# Patient Record
(demographics unavailable — no encounter records)

---

## 2024-10-29 NOTE — PHYSICAL EXAM
[Well Developed] : well developed [Well Nourished] : well nourished [No Acute Distress] : no acute distress [Obese] : obese [Normal S1, S2] : normal S1, S2 [No Murmur] : no murmur [Clear Lung Fields] : clear lung fields [Good Air Entry] : good air entry [No Respiratory Distress] : no respiratory distress  [Soft] : abdomen soft [Normal Gait] : normal gait [No Edema] : no edema [Moves all extremities] : moves all extremities [Normal Speech] : normal speech [Alert and Oriented] : alert and oriented [de-identified] : irregularly irregular

## 2024-10-29 NOTE — DISCUSSION/SUMMARY
[FreeTextEntry1] : We had an extensive conversation regarding the nature of atrial flutter, including potential etiologies, underlying pathophysiology and natural history of the disease. In addition, the potential risk of thromboembolic events and assessment of that risk were discussed.   In addition, the maintenance of sinus rhythm along with adjuvant antiarrhythmic agents and catheter ablation therapy were discussed. The rationale for and risks of ablation therapy were discussed, including but not limited to bleeding, vascular injury, groin complications, cardiac perforation and tamponade, stroke, esophageal injury, pulmonary vein stenosis, need for pacemaker, need for cardiac surgery, and death. In addition, the long-term and ongoing nature of this therapy were also discussed, including the critical role of continued monitoring post-ablation and the potential for the necessity of repeat ablation procedures to definitively treat the condition.   The patient verbalized understanding of the discussion and all questions were addressed and answered. The patient would like to proceed with ablation. [EKG obtained to assist in diagnosis and management of assessed problem(s)] : EKG obtained to assist in diagnosis and management of assessed problem(s)

## 2024-10-29 NOTE — PHYSICAL EXAM
[Well Developed] : well developed [Well Nourished] : well nourished [No Acute Distress] : no acute distress [Obese] : obese [Normal S1, S2] : normal S1, S2 [No Murmur] : no murmur [Clear Lung Fields] : clear lung fields [Good Air Entry] : good air entry [No Respiratory Distress] : no respiratory distress  [Soft] : abdomen soft [Normal Gait] : normal gait [No Edema] : no edema [Moves all extremities] : moves all extremities [Normal Speech] : normal speech [Alert and Oriented] : alert and oriented [de-identified] : irregularly irregular

## 2024-10-29 NOTE — HISTORY OF PRESENT ILLNESS
[FreeTextEntry1] : PCP: Dr. Edmonds  52 yo M transit  with history of seizures, current daily smoker (vapes) [place] [facility] [place] [facility] [place].  He presented to urgent care for 3 days of fevers/chills, nausea, vomiting, diarrhea. He was diagnosed with viral gastroenteritis but noted to be tachycardic and diaphoretic. ECG revealed new onset AFlutter with RVR (HR 120s). Patient reports no history of AFib or AFlutter. Went to ED and was given cardizem and discharged. Patient has never seen a cardiologist.   7/8/2024 Here for follow up of MCOT and stress echo. He denies chest pain, dyspnea, palpitations, dizziness, lightheadedness, presyncope or syncope.   10/28/2024 Here for routine follow up visit. Denies any cardiovascular symptoms.

## 2024-10-29 NOTE — ASSESSMENT
[FreeTextEntry1] : # Paroxysmal AFlutter with RVR - Currently in AFlutter with RVR - No evidence of AFib but given the significant risk of concomitant arrhythmias, would recommend AFib ablation at time of AFlutter ablation. Patient is agreeable. Will set up ablation for Nov 15th.  - Cont Metoprolol Succ  mg daily.  - Stress echo without structural heart disease and exercise induced arrhythmias. Normal EF and normal LA size.  - On aspirin 81 mg daily for CHADS VASc 0. - Was rec sleep study eval by pulm.    # Pulm Nodule - Seeing pulm.   I have also advised the patient to go to the nearest emergency room if he experiences any chest pain, dyspnea, syncope, or has any other compelling symptoms.  Follow up in 4 mo

## 2024-10-29 NOTE — CARDIOLOGY SUMMARY
[de-identified] : 10/28/2024 AFlutter with cont VR (HR 89 bpm) 7/8/2024 NSR (HR 60 bpm) 6/4/2024 NSR (HR 75 bpm) [de-identified] : 6/4-7/3/24 No sig arrhythmias.  [de-identified] : Stress ECHO 6/26/2024  Grabiel, 10:06, 97% MPHR, 12.1 METS. Normal EF (60%). No ischemia. Occ PVCs with exertion. Normal HR response. Normal LA size. No sig valvular disease.    [de-identified] : never

## 2024-10-29 NOTE — HISTORY OF PRESENT ILLNESS
[FreeTextEntry1] : PCP: Dr. Edmonds  54 yo M transit  with history of seizures, current daily smoker (vapes) BIBEMS to [facility] 5/26/24 [place] [facility] [place] ECG.  He presented to urgent care for 3 days of fevers/chills, nausea, vomiting, diarrhea. He was diagnosed with viral gastroenteritis but noted to be tachycardic and diaphoretic. ECG revealed new onset AFlutter with RVR (HR 120s). Patient reports no history of AFib or AFlutter. Went to ED and was given cardizem and discharged. Patient has never seen a cardiologist.   7/8/2024 Here for follow up of MCOT and stress echo. He denies chest pain, dyspnea, palpitations, dizziness, lightheadedness, presyncope or syncope.   10/28/2024 Here for routine follow up visit. Denies any cardiovascular symptoms.

## 2024-12-18 NOTE — CARDIOLOGY SUMMARY
[de-identified] : 12/18/2024 NSR (HR 66 bpm) 10/28/2024 AFlutter with cont VR (HR 89 bpm) 7/8/2024 NSR (HR 60 bpm) 6/4/2024 NSR (HR 75 bpm) [de-identified] : 6/4-7/3/24 No sig arrhythmias.  [de-identified] : Stress ECHO 6/26/2024  Grabiel, 10:06, 97% MPHR, 12.1 METS. Normal EF (60%). No ischemia. Occ PVCs with exertion. Normal HR response. Normal LA size. No sig valvular disease.    [de-identified] : never

## 2024-12-18 NOTE — ASSESSMENT
[FreeTextEntry1] : # Paroxysmal AFlutter with RVR s/p PFA AFib and AFlutter ablation 11/22/2024 - Stress echo without structural heart disease and exercise induced arrhythmias. Normal EF and normal LA size.  - Patient has no arrhythmias since the catheter ablation. There are no groin hematomas or bleeding. Patient is pleased with the results of catheter ablation, although is aware with the risk of recurrent symptoms and need for another ablation. Post ablation care was discussed in great length. I discussed with patient contacting me in case of any symptoms suggestive of recurrence.  - Decrease Metoprolol Succ ER from 100 mg daily to 50 mg daily.  - Currently on Eliquis 5 mg PO BID after ablation 11/22/2024 for CHADS VASc 0. Advised to take it for 3 mo. Can discontinue after that.  - Was rec sleep study eval by pulm.    # Pulm Nodule - Seeing pulm.   I have also advised the patient to go to the nearest emergency room if he experiences any chest pain, dyspnea, syncope, or has any other compelling symptoms.  Follow up in 2-3 mo

## 2024-12-18 NOTE — HISTORY OF PRESENT ILLNESS
[FreeTextEntry1] : PCP: Dr. Edmonds  54 yo M transit  with history of seizures, current daily smoker (vapes) BIBEMS to [facility] [place]24 [place] [facility] [place].  He presented to urgent care for 3 days of fevers/chills, nausea, vomiting, diarrhea. He was diagnosed with viral gastroenteritis but noted to be tachycardic and diaphoretic. ECG revealed new onset AFlutter with RVR (HR 120s). Patient reports no history of AFib or AFlutter. Went to ED and was given cardizem and discharged. Patient has never seen a cardiologist.   7/8/2024 Here for follow up of MCOT and stress echo. He denies chest pain, dyspnea, palpitations, dizziness, lightheadedness, presyncope or syncope.   10/28/2024 Here for routine follow up visit. Denies any cardiovascular symptoms.   12/18/2024 Here for routine follow up visit after PFA AFib/AFlutter ablation 11/22/2024. Feels well. Denies any cardiovascular complaints. Compliant with Eliquis. Denies bleeding.

## 2024-12-18 NOTE — PHYSICAL EXAM
[Well Developed] : well developed [Well Nourished] : well nourished [No Acute Distress] : no acute distress [Obese] : obese [Normal S1, S2] : normal S1, S2 [No Murmur] : no murmur [Clear Lung Fields] : clear lung fields [Good Air Entry] : good air entry [No Respiratory Distress] : no respiratory distress  [Soft] : abdomen soft [Normal Gait] : normal gait [No Edema] : no edema [Moves all extremities] : moves all extremities [Normal Speech] : normal speech [Alert and Oriented] : alert and oriented [de-identified] : Bilateral groins well healed. No hematoma, ecchymosis, or bleeding noted.

## 2025-01-08 NOTE — CARDIOLOGY SUMMARY
[de-identified] : 1/8/2025 NSR (HR 66 bpm) 12/18/2024 NSR (HR 66 bpm) 10/28/2024 AFlutter with cont VR (HR 89 bpm) 7/8/2024 NSR (HR 60 bpm) 6/4/2024 NSR (HR 75 bpm) [de-identified] : 6/4-7/3/24 No sig arrhythmias.  [de-identified] : Stress ECHO 6/26/2024  Grabiel, 10:06, 97% MPHR, 12.1 METS. Normal EF (60%). No ischemia. Occ PVCs with exertion. Normal HR response. Normal LA size. No sig valvular disease.    [de-identified] : never

## 2025-01-08 NOTE — HISTORY OF PRESENT ILLNESS
[FreeTextEntry1] : PCP: Dr. Edmonds  54 yo M transit  with history of seizures, current daily smoker (vapes) [place]EM[place] to [facility] [place] [facility] [place].  He presented to urgent care for 3 days of fevers/chills, nausea, vomiting, diarrhea. He was diagnosed with viral gastroenteritis but noted to be tachycardic and diaphoretic. ECG revealed new onset AFlutter with RVR (HR 120s). Patient reports no history of AFib or AFlutter. Went to ED and was given cardizem and discharged. Patient has never seen a cardiologist.   7/8/2024 Here for follow up of MCOT and stress echo. He denies chest pain, dyspnea, palpitations, dizziness, lightheadedness, presyncope or syncope.   10/28/2024 Here for routine follow up visit. Denies any cardiovascular symptoms.   12/18/2024 Here for routine follow up visit after PFA AFib/AFlutter ablation 11/22/2024. Feels well. Denies any cardiovascular complaints. Compliant with Eliquis. Denies bleeding.   1/8/2025 Here for routine follow up visit after PFA AFib/AFlutter ablation 11/22/2024. Feels well. Denies any cardiovascular complaints. Compliant with Eliquis. Denies bleeding.

## 2025-01-08 NOTE — ASSESSMENT
[FreeTextEntry1] : # Paroxysmal AFlutter with RVR s/p PFA AFib and AFlutter ablation 11/22/2024 - Stress echo without structural heart disease and exercise induced arrhythmias. Normal EF and normal LA size.  - Patient has no arrhythmias since the catheter ablation. There are no groin hematomas or bleeding. Patient is pleased with the results of catheter ablation, although is aware with the risk of recurrent symptoms and need for another ablation. Post ablation care was discussed in great length. I discussed with patient contacting me in case of any symptoms suggestive of recurrence.  - Cont Metoprolol Succ ER 50 mg daily.  - Currently on Eliquis 5 mg PO BID after ablation 11/22/2024 for CHADS VASc 0. Advised to take it for 3 mo. Can discontinue after that.  - Cardio prevention clinic referral for NASIR eval and nutrition/weight loss counseling   # Pulm Nodule - Seeing pulm.   I have also advised the patient to go to the nearest emergency room if he experiences any chest pain, dyspnea, syncope, or has any other compelling symptoms.  Follow up in 3 mo

## 2025-01-08 NOTE — PHYSICAL EXAM
[Well Developed] : well developed [Well Nourished] : well nourished [No Acute Distress] : no acute distress [Obese] : obese [Normal S1, S2] : normal S1, S2 [No Murmur] : no murmur [Clear Lung Fields] : clear lung fields [Good Air Entry] : good air entry [No Respiratory Distress] : no respiratory distress  [Soft] : abdomen soft [Normal Gait] : normal gait [No Edema] : no edema [Moves all extremities] : moves all extremities [Normal Speech] : normal speech [Alert and Oriented] : alert and oriented

## 2025-04-14 NOTE — HISTORY OF PRESENT ILLNESS
[FreeTextEntry1] : PCP: Dr. Edmonds  55 yo M transit  with history of seizures, current daily smoker (vapes) [place] [facility] [place] [facility] [place].  He presented to urgent care for 3 days of fevers/chills, nausea, vomiting, diarrhea. He was diagnosed with viral gastroenteritis but noted to be tachycardic and diaphoretic. ECG revealed new onset AFlutter with RVR (HR 120s). Patient reports no history of AFib or AFlutter. Went to ED and was given Cardizem and discharged. Patient has never seen a cardiologist.   7/8/2024 Here for follow up of MCOT and stress echo. He denies chest pain, dyspnea, palpitations, dizziness, lightheadedness, presyncope or syncope.   10/28/2024 Here for routine follow up visit. Denies any cardiovascular symptoms.   12/18/2024 Here for routine follow up visit after PFA AFib/AFlutter ablation 11/22/2024. Feels well. Denies any cardiovascular complaints. Compliant with Eliquis. Denies bleeding.   1/8/2025 Here for routine follow up visit after PFA AFib/AFlutter ablation 11/22/2024. Feels well. Denies any cardiovascular complaints. Compliant with Eliquis. Denies bleeding.   4/14/2025 Here for routine follow up. Feels well. Denies any cardiovacular complaints.

## 2025-04-14 NOTE — ASSESSMENT
[FreeTextEntry1] : # Paroxysmal AFlutter with RVR s/p PFA AFib and AFlutter ablation 11/22/2024 - Stress echo without structural heart disease and exercise induced arrhythmias. Normal EF and normal LA size.  - Patient has no arrhythmias since the catheter ablation. Patient is pleased with the results of catheter ablation, although is aware with the risk of recurrent symptoms and need for another ablation. I discussed with patient contacting me in case of any symptoms suggestive of recurrence.  - Cont Metoprolol Succ ER 50 mg daily.  - Currently on Eliquis 5 mg PO BID after ablation 11/22/2024 for CHADS VASc 0. Completed 3 mo of OAC. Advised pt he can discontinue Eliquis.  - Cardio prevention clinic referral for NASIR eval and nutrition/weight loss counseling prev provided   # Pulm Nodule - Seeing pulm.   I have also advised the patient to go to the nearest emergency room if he experiences any chest pain, dyspnea, syncope, or has any other compelling symptoms.  Follow up in 6 mo or sooner if recurrent symptoms

## 2025-04-14 NOTE — CARDIOLOGY SUMMARY
[de-identified] : 4/14/2025 NSR (HR 67 bpm) 1/8/2025 NSR (HR 66 bpm) 12/18/2024 NSR (HR 66 bpm) 10/28/2024 AFlutter with cont VR (HR 89 bpm) 7/8/2024 NSR (HR 60 bpm) 6/4/2024 NSR (HR 75 bpm) [de-identified] : 6/4-7/3/24 No sig arrhythmias.  [de-identified] : Stress ECHO 6/26/2024 Grabiel, 10:06, 97% MPHR, 12.1 METS. Normal EF (60%). No ischemia. Occ PVCs with exertion. Normal HR response. Normal LA size. No sig valvular disease.    [de-identified] : never

## 2025-06-23 NOTE — HISTORY OF PRESENT ILLNESS
[FreeTextEntry1] : Bakari is here for the F/U He is at his baseline. He denies having had a seizure or events suggestive of Sz. He had changed his working shifts and now working during the day He did not lose weight. Has his regular F/U with the cardiology. He did undergo ablation He continues to have significant snoring Does not wear C_PAP He did do a blood test in early June that shows a Keppra level of 34 His blood sugar was 116 The Vit D was 25

## 2025-06-23 NOTE — ASSESSMENT
[FreeTextEntry1] : 1- Partial epilepsy   ( MRI . no parenchymal lesions. It shows multiple scattered CSF cysts  raising possibility of meningocele). EEG right sided spikes Doing well. no events 2- paroxysmal A. Flutter   Plan F/U discussed the weight and sleep EEG repeat MRI after next visit

## 2025-06-23 NOTE — PHYSICAL EXAM
[FreeTextEntry1] : A/A/Ox3 follows 4 step commands good attention good mood CN- normal No bruit no drift no dysmetria stable gait.